# Patient Record
Sex: MALE | Race: WHITE | NOT HISPANIC OR LATINO | URBAN - METROPOLITAN AREA
[De-identification: names, ages, dates, MRNs, and addresses within clinical notes are randomized per-mention and may not be internally consistent; named-entity substitution may affect disease eponyms.]

---

## 2023-02-27 LAB
ANION GAP IN SER/PLAS: 9 MMOL/L (ref 10–20)
CALCIUM (MG/DL) IN SER/PLAS: 8.6 MG/DL (ref 8.6–10.3)
CARBON DIOXIDE, TOTAL (MMOL/L) IN SER/PLAS: 31 MMOL/L (ref 21–32)
CHLORIDE (MMOL/L) IN SER/PLAS: 104 MMOL/L (ref 98–107)
CREATININE (MG/DL) IN SER/PLAS: 0.83 MG/DL (ref 0.5–1.3)
ERYTHROCYTE DISTRIBUTION WIDTH (RATIO) BY AUTOMATED COUNT: 18.9 % (ref 11.5–14.5)
ERYTHROCYTE MEAN CORPUSCULAR HEMOGLOBIN CONCENTRATION (G/DL) BY AUTOMATED: 28.8 G/DL (ref 32–36)
ERYTHROCYTE MEAN CORPUSCULAR VOLUME (FL) BY AUTOMATED COUNT: 86 FL (ref 80–100)
ERYTHROCYTES (10*6/UL) IN BLOOD BY AUTOMATED COUNT: 4.26 X10E12/L (ref 4.5–5.9)
GFR MALE: >90 ML/MIN/1.73M2
GLUCOSE (MG/DL) IN SER/PLAS: 92 MG/DL (ref 74–99)
HEMATOCRIT (%) IN BLOOD BY AUTOMATED COUNT: 36.8 % (ref 41–52)
HEMOGLOBIN (G/DL) IN BLOOD: 10.6 G/DL (ref 13.5–17.5)
LEUKOCYTES (10*3/UL) IN BLOOD BY AUTOMATED COUNT: 9.4 X10E9/L (ref 4.4–11.3)
NRBC (PER 100 WBCS) BY AUTOMATED COUNT: 0 /100 WBC (ref 0–0)
PLATELETS (10*3/UL) IN BLOOD AUTOMATED COUNT: 314 X10E9/L (ref 150–450)
POTASSIUM (MMOL/L) IN SER/PLAS: 4.3 MMOL/L (ref 3.5–5.3)
SODIUM (MMOL/L) IN SER/PLAS: 140 MMOL/L (ref 136–145)
UREA NITROGEN (MG/DL) IN SER/PLAS: 27 MG/DL (ref 6–23)

## 2023-03-02 LAB
ALANINE AMINOTRANSFERASE (SGPT) (U/L) IN SER/PLAS: 8 U/L (ref 10–52)
ALBUMIN (G/DL) IN SER/PLAS: 3.2 G/DL (ref 3.4–5)
ALKALINE PHOSPHATASE (U/L) IN SER/PLAS: 86 U/L (ref 33–136)
ANION GAP IN SER/PLAS: 10 MMOL/L (ref 10–20)
ASPARTATE AMINOTRANSFERASE (SGOT) (U/L) IN SER/PLAS: 9 U/L (ref 9–39)
BILIRUBIN DIRECT (MG/DL) IN SER/PLAS: 0.1 MG/DL (ref 0–0.3)
BILIRUBIN TOTAL (MG/DL) IN SER/PLAS: 0.3 MG/DL (ref 0–1.2)
CALCIUM (MG/DL) IN SER/PLAS: 8.6 MG/DL (ref 8.6–10.3)
CARBON DIOXIDE, TOTAL (MMOL/L) IN SER/PLAS: 31 MMOL/L (ref 21–32)
CHLORIDE (MMOL/L) IN SER/PLAS: 105 MMOL/L (ref 98–107)
CREATININE (MG/DL) IN SER/PLAS: 0.75 MG/DL (ref 0.5–1.3)
ERYTHROCYTE DISTRIBUTION WIDTH (RATIO) BY AUTOMATED COUNT: 18.7 % (ref 11.5–14.5)
ERYTHROCYTE MEAN CORPUSCULAR HEMOGLOBIN CONCENTRATION (G/DL) BY AUTOMATED: 29.1 G/DL (ref 32–36)
ERYTHROCYTE MEAN CORPUSCULAR VOLUME (FL) BY AUTOMATED COUNT: 84 FL (ref 80–100)
ERYTHROCYTES (10*6/UL) IN BLOOD BY AUTOMATED COUNT: 4.28 X10E12/L (ref 4.5–5.9)
GFR MALE: >90 ML/MIN/1.73M2
GLUCOSE (MG/DL) IN SER/PLAS: 85 MG/DL (ref 74–99)
HEMATOCRIT (%) IN BLOOD BY AUTOMATED COUNT: 36.1 % (ref 41–52)
HEMOGLOBIN (G/DL) IN BLOOD: 10.5 G/DL (ref 13.5–17.5)
INR IN PPP BY COAGULATION ASSAY: 1 (ref 0.9–1.1)
LEUKOCYTES (10*3/UL) IN BLOOD BY AUTOMATED COUNT: 9.3 X10E9/L (ref 4.4–11.3)
NRBC (PER 100 WBCS) BY AUTOMATED COUNT: 0 /100 WBC (ref 0–0)
PLATELETS (10*3/UL) IN BLOOD AUTOMATED COUNT: 281 X10E9/L (ref 150–450)
POTASSIUM (MMOL/L) IN SER/PLAS: 3.7 MMOL/L (ref 3.5–5.3)
PROTEIN TOTAL: 6 G/DL (ref 6.4–8.2)
PROTHROMBIN TIME (PT) IN PPP BY COAGULATION ASSAY: 11.7 SEC (ref 9.8–13.4)
SODIUM (MMOL/L) IN SER/PLAS: 142 MMOL/L (ref 136–145)
UREA NITROGEN (MG/DL) IN SER/PLAS: 25 MG/DL (ref 6–23)

## 2023-03-03 LAB
HCV PCR QUANT: NOT DETECTED IU/ML
HCV RNA, PCR LOG: NORMAL LOG10 IU/ML

## 2023-04-26 LAB
ERYTHROCYTE DISTRIBUTION WIDTH (RATIO) BY AUTOMATED COUNT: 16.6 % (ref 11.5–14.5)
ERYTHROCYTE MEAN CORPUSCULAR HEMOGLOBIN CONCENTRATION (G/DL) BY AUTOMATED: 29.5 G/DL (ref 32–36)
ERYTHROCYTE MEAN CORPUSCULAR VOLUME (FL) BY AUTOMATED COUNT: 84 FL (ref 80–100)
ERYTHROCYTES (10*6/UL) IN BLOOD BY AUTOMATED COUNT: 4.12 X10E12/L (ref 4.5–5.9)
HEMATOCRIT (%) IN BLOOD BY AUTOMATED COUNT: 34.6 % (ref 41–52)
HEMOGLOBIN (G/DL) IN BLOOD: 10.2 G/DL (ref 13.5–17.5)
LEUKOCYTES (10*3/UL) IN BLOOD BY AUTOMATED COUNT: 11.8 X10E9/L (ref 4.4–11.3)
NRBC (PER 100 WBCS) BY AUTOMATED COUNT: 0 /100 WBC (ref 0–0)
PLATELETS (10*3/UL) IN BLOOD AUTOMATED COUNT: 319 X10E9/L (ref 150–450)

## 2023-05-12 ENCOUNTER — NURSING HOME VISIT (OUTPATIENT)
Dept: POST ACUTE CARE | Facility: EXTERNAL LOCATION | Age: 63
End: 2023-05-12
Payer: MEDICARE

## 2023-05-12 DIAGNOSIS — G89.29 OTHER CHRONIC PAIN: ICD-10-CM

## 2023-05-12 DIAGNOSIS — R53.1 WEAKNESS: ICD-10-CM

## 2023-05-12 DIAGNOSIS — L97.909 VENOUS ULCER (MULTI): ICD-10-CM

## 2023-05-12 DIAGNOSIS — B19.20 HEPATITIS C VIRUS INFECTION WITHOUT HEPATIC COMA, UNSPECIFIED CHRONICITY: ICD-10-CM

## 2023-05-12 DIAGNOSIS — M54.9 CHRONIC BILATERAL BACK PAIN, UNSPECIFIED BACK LOCATION: Primary | ICD-10-CM

## 2023-05-12 DIAGNOSIS — I87.2 CHRONIC VENOUS INSUFFICIENCY: ICD-10-CM

## 2023-05-12 DIAGNOSIS — I83.009 VENOUS ULCER (MULTI): ICD-10-CM

## 2023-05-12 DIAGNOSIS — R60.0 LEG EDEMA: ICD-10-CM

## 2023-05-12 DIAGNOSIS — G89.29 CHRONIC BILATERAL BACK PAIN, UNSPECIFIED BACK LOCATION: Primary | ICD-10-CM

## 2023-05-12 PROCEDURE — 99305 1ST NF CARE MODERATE MDM 35: CPT | Performed by: INTERNAL MEDICINE

## 2023-05-12 NOTE — LETTER
Patient: Ney Edgar  : 1960    Encounter Date: 2023    HISTORY & PHYSICAL    Subjective  Chief complaint: Ney Edgar is a 62 y.o. male who is a acute skilled care patient being seen and evaluated for multiple medical problems.  Patient presents for weakness.    HPI:  Patient presented to the ED with chronic leg swelling. Patient admitted to the hospital and was given IV lasix. Pt was evaluated by PT and OT and was discharged to a skilled nursing facility.         Past Medical History:   Diagnosis Date   • Personal history of malignant neoplasm of testis     History of malignant neoplasm of testis   • Personal history of other diseases of the circulatory system     History of hypertension       Past Surgical History:   Procedure Laterality Date   • OTHER SURGICAL HISTORY  2019    Cholecystectomy   • OTHER SURGICAL HISTORY  2019    Knee surgery   • OTHER SURGICAL HISTORY  2019    Orchiectomy       No family history on file.    Social History     Socioeconomic History   • Marital status: Single     Spouse name: Not on file   • Number of children: Not on file   • Years of education: Not on file   • Highest education level: Not on file   Occupational History   • Not on file   Tobacco Use   • Smoking status: Not on file   • Smokeless tobacco: Not on file   Substance and Sexual Activity   • Alcohol use: Not on file   • Drug use: Not on file   • Sexual activity: Not on file   Other Topics Concern   • Not on file   Social History Narrative   • Not on file     Social Determinants of Health     Financial Resource Strain: Not on file   Food Insecurity: Not on file   Transportation Needs: Not on file   Physical Activity: Not on file   Stress: Not on file   Social Connections: Not on file   Intimate Partner Violence: Not on file   Housing Stability: Not on file       Vital signs: 162/74, 97.1, 87, 16, 96%    Objective  Physical Exam  Constitutional:       Appearance: He is obese.   HENT:       Head: Normocephalic and atraumatic.      Nose: Nose normal.      Mouth/Throat:      Mouth: Mucous membranes are moist.      Pharynx: Oropharynx is clear.   Eyes:      Extraocular Movements: Extraocular movements intact.      Pupils: Pupils are equal, round, and reactive to light.   Cardiovascular:      Rate and Rhythm: Normal rate and regular rhythm.   Pulmonary:      Effort: No respiratory distress.      Breath sounds: Normal breath sounds. No wheezing, rhonchi or rales.   Abdominal:      General: Bowel sounds are normal. There is no distension.      Palpations: Abdomen is soft.      Tenderness: There is no abdominal tenderness. There is no guarding.   Musculoskeletal:      Right lower leg: No edema.      Left lower leg: No edema.   Skin:     General: Skin is warm and dry.   Neurological:      Mental Status: He is alert and oriented to person, place, and time. Mental status is at baseline.   Psychiatric:         Mood and Affect: Mood normal.         Behavior: Behavior normal.         Assessment/Plan  Problem List Items Addressed This Visit       Leg edema    Venous ulcer (CMS/HCC)    Chronic pain    Weakness    Back pain - Primary    Chronic venous insufficiency    Hepatitis-C     Medications, treatments, and labs reviewed  Continue medications and treatments as listed in [facility]    Scribe Attestation  I, Hang Walter   attest that this documentation has been prepared under the direction and in the presence of Carmelo Crabtree DO.    An interactive audio and/or video telecommunication system which permits real time communications between the patient (at the originating site) and provider (at a distant site) was utilized to provide this telehealth service after obtaining verbal consent.    Provider Attestation - Scribe documentation  All medical record entries made by the Scribe were at my direction and personally dictated by me. I have reviewed the chart and agree that the record accurately reflects my  personal performance of the history, physical exam, discussion and plan.    Carmelo Crabtree DO          Electronically Signed By: Carmelo Crabtree DO   6/19/23  4:40 PM

## 2023-05-19 ENCOUNTER — NURSING HOME VISIT (OUTPATIENT)
Dept: POST ACUTE CARE | Facility: EXTERNAL LOCATION | Age: 63
End: 2023-05-19
Payer: MEDICARE

## 2023-05-19 DIAGNOSIS — R60.0 LEG EDEMA: ICD-10-CM

## 2023-05-19 DIAGNOSIS — R53.1 WEAKNESS: ICD-10-CM

## 2023-05-19 DIAGNOSIS — L97.909 VENOUS ULCER (MULTI): ICD-10-CM

## 2023-05-19 DIAGNOSIS — G89.29 OTHER CHRONIC PAIN: Primary | ICD-10-CM

## 2023-05-19 DIAGNOSIS — I83.009 VENOUS ULCER (MULTI): ICD-10-CM

## 2023-05-19 PROCEDURE — 99309 SBSQ NF CARE MODERATE MDM 30: CPT | Performed by: REGISTERED NURSE

## 2023-05-19 NOTE — LETTER
Patient: Ney Edgar  : 1960    Encounter Date: 2023    PROGRESS NOTE    Subjective  Chief complaint: Ney Edgar is a 62 y.o. male who is a acute skilled care patient being seen and evaluated for weakness.    HPI:  23   Nursing staff reported that patient is refusing therapy and dressing change.  Patient was admitted to SNF for therapy d/t weakness after recent hospitalization.   Patient requires assist with ADLs and transfers.  Therapy working with patient on BLE therapy exercises, walker management and gait training.  Patient able to ambulate 20-44 feet with walker and CGA.  Transfers sit-stand with SBA.      Objective  Vital signs: 117/62, 98.8, 96, 18, 93%  Physical Exam  Constitutional:       General: He is not in acute distress.  Eyes:      Extraocular Movements: Extraocular movements intact.   Pulmonary:      Effort: Pulmonary effort is normal.   Musculoskeletal:      Cervical back: Neck supple.   Neurological:      Mental Status: He is alert.   Psychiatric:         Mood and Affect: Mood normal.         Behavior: Behavior is cooperative.         Assessment/Plan  Problem List Items Addressed This Visit          Nervous    Chronic pain - Primary     Continue oral analgesic as needed follow-up pain management            Musculoskeletal    Leg edema     Ace wrap, elevate legs, low-sodium intake            Other    Venous ulcer (CMS/HCC)     Follow up wound team          Weakness     Continue PT OT          Medications, treatments, and labs reviewed  Continue medications and treatments as listed in [facility]    Scribe Attestation  Acacia MAS Scribe   attest that this documentation has been prepared under the direction and in the presence of CARMEN Castle-YARELI    Provider Attestation - Scribe documentation  All medical record entries made by the Scribe were at my direction and personally dictated by me. I have reviewed the chart and agree that the record accurately reflects my  personal performance of the history, physical exam, discussion and plan.   ABIGAIL Castle        Electronically Signed By: ABIGAIL Castle   6/8/23  1:10 PM

## 2023-05-19 NOTE — PROGRESS NOTES
PROGRESS NOTE    Subjective   Chief complaint: Ney Edgar is a 62 y.o. male who is a acute skilled care patient being seen and evaluated for weakness.    HPI:  5/19/23   Nursing staff reported that patient is refusing therapy and dressing change.  Patient was admitted to SNF for therapy d/t weakness after recent hospitalization.   Patient requires assist with ADLs and transfers.  Therapy working with patient on BLE therapy exercises, walker management and gait training.  Patient able to ambulate 20-44 feet with walker and CGA.  Transfers sit-stand with SBA.    Nurses notified me patient lethargic after taking pain medication sleeping outside of smoking refusing dressing changes due to lethargy, and not participating in therapy due to sleepiness I suspect from the oral analgesic and will decrease dose  Objective   Vital signs: 117/62, 98.8, 96, 18, 93%  Physical Exam  Constitutional:       General: He is not in acute distress.  Eyes:      Extraocular Movements: Extraocular movements intact.   Pulmonary:      Effort: Pulmonary effort is normal.   Musculoskeletal:      Cervical back: Neck supple.   Neurological:      Mental Status: He is alert.   Psychiatric:         Mood and Affect: Mood normal.         Behavior: Behavior is cooperative.         Assessment/Plan   Problem List Items Addressed This Visit          Nervous    Chronic pain - Primary     Continue oral analgesic as needed follow-up pain management            Musculoskeletal    Leg edema     Ace wrap, elevate legs, low-sodium intake            Other    Venous ulcer (CMS/[facility])     Follow up wound team          Weakness     Continue PT OT          Medications, treatments, and labs reviewed  Continue medications and treatments as listed in [facility]    Scribe Attestation  I, Hang Packer   attest that this documentation has been prepared under the direction and in the presence of CARMEN Castle-YARELI    Provider Attestation - Scribe  documentation  All medical record entries made by the Scribe were at my direction and personally dictated by me. I have reviewed the chart and agree that the record accurately reflects my personal performance of the history, physical exam, discussion and plan.   Carmelo Franco, APRN-CNP

## 2023-05-22 ENCOUNTER — NURSING HOME VISIT (OUTPATIENT)
Dept: POST ACUTE CARE | Facility: EXTERNAL LOCATION | Age: 63
End: 2023-05-22
Payer: MEDICARE

## 2023-05-22 DIAGNOSIS — G89.29 OTHER CHRONIC PAIN: Primary | ICD-10-CM

## 2023-05-22 DIAGNOSIS — R60.0 LEG EDEMA: ICD-10-CM

## 2023-05-22 DIAGNOSIS — L97.909 VENOUS ULCER (MULTI): ICD-10-CM

## 2023-05-22 DIAGNOSIS — R53.1 WEAKNESS: ICD-10-CM

## 2023-05-22 DIAGNOSIS — I83.009 VENOUS ULCER (MULTI): ICD-10-CM

## 2023-05-22 PROCEDURE — 99309 SBSQ NF CARE MODERATE MDM 30: CPT | Performed by: REGISTERED NURSE

## 2023-05-22 NOTE — LETTER
Patient: Ney Edgar  : 1960    Encounter Date: 2023    PROGRESS NOTE    Subjective  Chief complaint: Ney Edgar is a 62 y.o. male who is a acute skilled care patient being seen and evaluated for monthly general medical care and follow-up.    HPI:  HPI Pt continues to work towards goals in therapy   Objective  Vital signs:   167/86, 97.8, 61, 18, 95%  Physical Exam  Constitutional:       General: He is not in acute distress.  Eyes:      Extraocular Movements: Extraocular movements intact.   Cardiovascular:      Rate and Rhythm: Normal rate and regular rhythm.   Pulmonary:      Effort: Pulmonary effort is normal.      Breath sounds: Normal breath sounds.   Abdominal:      General: Bowel sounds are normal.      Palpations: Abdomen is soft.   Musculoskeletal:      Cervical back: Neck supple.      Right lower leg: No edema.      Left lower leg: No edema.   Neurological:      Mental Status: He is alert.   Psychiatric:         Mood and Affect: Mood normal.         Behavior: Behavior is cooperative.         Assessment/Plan  Problem List Items Addressed This Visit          Nervous    Chronic pain - Primary     Continue analgesic as needed            Musculoskeletal    Leg edema     Ace wrap, elevate legs, low-sodium intake            Other    Venous ulcer (CMS/HCC)     Follow up wound team          Weakness     Continue PT OT          Medications, treatments, and labs reviewed  Continue medications and treatments as listed in [facility]    Scribe Attestation  Acacia AMS Scribe   attest that this documentation has been prepared under the direction and in the presence of CARMEN Castle-YARELI    Provider Attestation - Scribe documentation  All medical record entries made by the Scribe were at my direction and personally dictated by me. I have reviewed the chart and agree that the record accurately reflects my personal performance of the history, physical exam, discussion and plan.   Carmelo NORRIS  ABIGAIL Franco        Electronically Signed By: ABIGAIL Castle   6/15/23  9:31 AM

## 2023-05-23 ENCOUNTER — NURSING HOME VISIT (OUTPATIENT)
Dept: POST ACUTE CARE | Facility: EXTERNAL LOCATION | Age: 63
End: 2023-05-23
Payer: MEDICARE

## 2023-05-23 DIAGNOSIS — R53.1 WEAKNESS: ICD-10-CM

## 2023-05-23 DIAGNOSIS — L97.909 VENOUS ULCER (MULTI): ICD-10-CM

## 2023-05-23 DIAGNOSIS — R60.0 LEG EDEMA: ICD-10-CM

## 2023-05-23 DIAGNOSIS — I83.009 VENOUS ULCER (MULTI): ICD-10-CM

## 2023-05-23 DIAGNOSIS — G89.29 OTHER CHRONIC PAIN: Primary | ICD-10-CM

## 2023-05-23 PROCEDURE — 99308 SBSQ NF CARE LOW MDM 20: CPT | Performed by: REGISTERED NURSE

## 2023-05-23 NOTE — LETTER
Patient: Ney Edgar  : 1960    Encounter Date: 2023    PROGRESS NOTE    Subjective  Chief complaint: Ney Edgar is a 62 y.o. male who is a acute skilled care patient being seen and evaluated for weakness.    HPI:  23   Nursing staff reported that patient is refusing therapy and dressing change.  Patient was admitted to SNF for therapy d/t weakness after recent hospitalization.   Patient requires assist with ADLs and transfers.  Therapy working with patient on BLE therapy exercises, walker management and gait training.  Patient able to ambulate 20-44 feet with walker and CGA.  Transfers sit-stand with SBA.    23    Patient in therapy d/t generalized weakness.  Patient presents for f/u.  Patient has been refusing therapy.  Per nursing staff, patient is non-compliant with dressing change.      Nurses report patient noncompliant with dressing changes and therapy  Objective  Vital signs:  167/86, 97.8, 61, 18, 95%  Physical Exam  Constitutional:       General: He is not in acute distress.  Eyes:      Extraocular Movements: Extraocular movements intact.   Pulmonary:      Effort: Pulmonary effort is normal.   Musculoskeletal:      Cervical back: Neck supple.   Neurological:      Mental Status: He is alert.   Psychiatric:         Mood and Affect: Mood normal.         Behavior: Behavior is cooperative.         Assessment/Plan  Problem List Items Addressed This Visit          Nervous    Chronic pain - Primary     Continue analgesic as needed            Musculoskeletal    Leg edema     Ace wrap, elevate legs, low-sodium intake            Other    Venous ulcer (CMS/HCC)     Follow up wound team          Weakness     Continue PT OT          Medications, treatments, and labs reviewed  Continue medications and treatments as listed in [facility]    Scribe Attestation  I, Hang Packer   attest that this documentation has been prepared under the direction and in the presence of Carmelo Franco,  CARMEN-CNP    Provider Attestation - Scribe documentation  All medical record entries made by the Scribe were at my direction and personally dictated by me. I have reviewed the chart and agree that the record accurately reflects my personal performance of the history, physical exam, discussion and plan.   ABIGAIL Castle        Electronically Signed By: ABIGAIL Castle   6/8/23  2:07 PM

## 2023-05-23 NOTE — PROGRESS NOTES
PROGRESS NOTE    Subjective   Chief complaint: Ney Edgar is a 62 y.o. male who is a acute skilled care patient being seen and evaluated for monthly general medical care and follow-up.    HPI:  HPI Pt continues to work towards goals in therapy   Objective   Vital signs:   167/86, 97.8, 61, 18, 95%  Physical Exam  Constitutional:       General: He is not in acute distress.  Eyes:      Extraocular Movements: Extraocular movements intact.   Cardiovascular:      Rate and Rhythm: Normal rate and regular rhythm.   Pulmonary:      Effort: Pulmonary effort is normal.      Breath sounds: Normal breath sounds.   Abdominal:      General: Bowel sounds are normal.      Palpations: Abdomen is soft.   Musculoskeletal:      Cervical back: Neck supple.      Right lower leg: No edema.      Left lower leg: No edema.   Neurological:      Mental Status: He is alert.   Psychiatric:         Mood and Affect: Mood normal.         Behavior: Behavior is cooperative.         Assessment/Plan   Problem List Items Addressed This Visit          Nervous    Chronic pain - Primary     Continue analgesic as needed            Musculoskeletal    Leg edema     Ace wrap, elevate legs, low-sodium intake            Other    Venous ulcer (CMS/HCC)     Follow up wound team          Weakness     Continue PT OT          Medications, treatments, and labs reviewed  Continue medications and treatments as listed in [facility]    Scribe Attestation  Acacia MAS Scribe   attest that this documentation has been prepared under the direction and in the presence of ABIGAIL Castle    Provider Attestation - Scribe documentation  All medical record entries made by the Scribe were at my direction and personally dictated by me. I have reviewed the chart and agree that the record accurately reflects my personal performance of the history, physical exam, discussion and plan.   ABIGAIL Castle

## 2023-05-23 NOTE — PROGRESS NOTES
PROGRESS NOTE    Subjective   Chief complaint: Ney Edgar is a 62 y.o. male who is a acute skilled care patient being seen and evaluated for weakness.    HPI:  5/19/23   Nursing staff reported that patient is refusing therapy and dressing change.  Patient was admitted to SNF for therapy d/t weakness after recent hospitalization.   Patient requires assist with ADLs and transfers.  Therapy working with patient on BLE therapy exercises, walker management and gait training.  Patient able to ambulate 20-44 feet with walker and CGA.  Transfers sit-stand with SBA.    5/23/23    Patient in therapy d/t generalized weakness.  Patient presents for f/u.  Patient has been refusing therapy.  Per nursing staff, patient is non-compliant with dressing change.      Nurses report patient noncompliant with dressing changes and therapy  Objective   Vital signs:  167/86, 97.8, 61, 18, 95%  Physical Exam  Constitutional:       General: He is not in acute distress.  Eyes:      Extraocular Movements: Extraocular movements intact.   Pulmonary:      Effort: Pulmonary effort is normal.   Musculoskeletal:      Cervical back: Neck supple.   Neurological:      Mental Status: He is alert.   Psychiatric:         Mood and Affect: Mood normal.         Behavior: Behavior is cooperative.         Assessment/Plan   Problem List Items Addressed This Visit          Nervous    Chronic pain - Primary     Continue analgesic as needed            Musculoskeletal    Leg edema     Ace wrap, elevate legs, low-sodium intake            Other    Venous ulcer (CMS/HCC)     Follow up wound team          Weakness     Continue PT OT          Medications, treatments, and labs reviewed  Continue medications and treatments as listed in [facility]    Scribe Attestation  I, Hang Packer   attest that this documentation has been prepared under the direction and in the presence of ABIGAIL Castle    Provider Attestation - Scribe documentation  All medical  record entries made by the Scribe were at my direction and personally dictated by me. I have reviewed the chart and agree that the record accurately reflects my personal performance of the history, physical exam, discussion and plan.   Carmelo Franco, APRN-CNP

## 2023-05-24 LAB
ANION GAP IN SER/PLAS: 14 MMOL/L (ref 10–20)
CALCIUM (MG/DL) IN SER/PLAS: 8 MG/DL (ref 8.6–10.3)
CARBON DIOXIDE, TOTAL (MMOL/L) IN SER/PLAS: 29 MMOL/L (ref 21–32)
CHLORIDE (MMOL/L) IN SER/PLAS: 103 MMOL/L (ref 98–107)
CREATININE (MG/DL) IN SER/PLAS: 0.76 MG/DL (ref 0.5–1.3)
ERYTHROCYTE DISTRIBUTION WIDTH (RATIO) BY AUTOMATED COUNT: 16.2 % (ref 11.5–14.5)
ERYTHROCYTE MEAN CORPUSCULAR HEMOGLOBIN CONCENTRATION (G/DL) BY AUTOMATED: 30.2 G/DL (ref 32–36)
ERYTHROCYTE MEAN CORPUSCULAR VOLUME (FL) BY AUTOMATED COUNT: 83 FL (ref 80–100)
ERYTHROCYTES (10*6/UL) IN BLOOD BY AUTOMATED COUNT: 3.74 X10E12/L (ref 4.5–5.9)
GFR MALE: >90 ML/MIN/1.73M2
GLUCOSE (MG/DL) IN SER/PLAS: 85 MG/DL (ref 74–99)
HEMATOCRIT (%) IN BLOOD BY AUTOMATED COUNT: 31.1 % (ref 41–52)
HEMOGLOBIN (G/DL) IN BLOOD: 9.4 G/DL (ref 13.5–17.5)
LEUKOCYTES (10*3/UL) IN BLOOD BY AUTOMATED COUNT: 14.1 X10E9/L (ref 4.4–11.3)
NRBC (PER 100 WBCS) BY AUTOMATED COUNT: 0 /100 WBC (ref 0–0)
PLATELETS (10*3/UL) IN BLOOD AUTOMATED COUNT: 239 X10E9/L (ref 150–450)
POTASSIUM (MMOL/L) IN SER/PLAS: 4.1 MMOL/L (ref 3.5–5.3)
SODIUM (MMOL/L) IN SER/PLAS: 142 MMOL/L (ref 136–145)
UREA NITROGEN (MG/DL) IN SER/PLAS: 29 MG/DL (ref 6–23)

## 2023-05-26 ENCOUNTER — NURSING HOME VISIT (OUTPATIENT)
Dept: POST ACUTE CARE | Facility: EXTERNAL LOCATION | Age: 63
End: 2023-05-26
Payer: MEDICARE

## 2023-05-26 DIAGNOSIS — J06.9 UPPER RESPIRATORY TRACT INFECTION, UNSPECIFIED TYPE: Primary | ICD-10-CM

## 2023-05-26 DIAGNOSIS — L97.909 VENOUS ULCER (MULTI): ICD-10-CM

## 2023-05-26 DIAGNOSIS — R53.1 WEAKNESS: ICD-10-CM

## 2023-05-26 DIAGNOSIS — G89.29 OTHER CHRONIC PAIN: ICD-10-CM

## 2023-05-26 DIAGNOSIS — I83.009 VENOUS ULCER (MULTI): ICD-10-CM

## 2023-05-26 DIAGNOSIS — R60.0 LEG EDEMA: ICD-10-CM

## 2023-05-26 LAB
ANION GAP IN SER/PLAS: 10 MMOL/L (ref 10–20)
CALCIUM (MG/DL) IN SER/PLAS: 8.2 MG/DL (ref 8.6–10.3)
CARBON DIOXIDE, TOTAL (MMOL/L) IN SER/PLAS: 32 MMOL/L (ref 21–32)
CHLORIDE (MMOL/L) IN SER/PLAS: 103 MMOL/L (ref 98–107)
CREATININE (MG/DL) IN SER/PLAS: 0.77 MG/DL (ref 0.5–1.3)
ERYTHROCYTE DISTRIBUTION WIDTH (RATIO) BY AUTOMATED COUNT: 16.2 % (ref 11.5–14.5)
ERYTHROCYTE MEAN CORPUSCULAR HEMOGLOBIN CONCENTRATION (G/DL) BY AUTOMATED: 30.2 G/DL (ref 32–36)
ERYTHROCYTE MEAN CORPUSCULAR VOLUME (FL) BY AUTOMATED COUNT: 84 FL (ref 80–100)
ERYTHROCYTES (10*6/UL) IN BLOOD BY AUTOMATED COUNT: 3.84 X10E12/L (ref 4.5–5.9)
GFR MALE: >90 ML/MIN/1.73M2
GLUCOSE (MG/DL) IN SER/PLAS: 97 MG/DL (ref 74–99)
HEMATOCRIT (%) IN BLOOD BY AUTOMATED COUNT: 32.4 % (ref 41–52)
HEMOGLOBIN (G/DL) IN BLOOD: 9.8 G/DL (ref 13.5–17.5)
LEUKOCYTES (10*3/UL) IN BLOOD BY AUTOMATED COUNT: 11.7 X10E9/L (ref 4.4–11.3)
NRBC (PER 100 WBCS) BY AUTOMATED COUNT: 0 /100 WBC (ref 0–0)
PLATELETS (10*3/UL) IN BLOOD AUTOMATED COUNT: 228 X10E9/L (ref 150–450)
POTASSIUM (MMOL/L) IN SER/PLAS: 4.3 MMOL/L (ref 3.5–5.3)
SODIUM (MMOL/L) IN SER/PLAS: 141 MMOL/L (ref 136–145)
UREA NITROGEN (MG/DL) IN SER/PLAS: 27 MG/DL (ref 6–23)

## 2023-05-26 PROCEDURE — 99316 NF DSCHRG MGMT 30 MIN+: CPT | Performed by: REGISTERED NURSE

## 2023-05-26 NOTE — LETTER
Patient: Ney Edgar  : 1960    Encounter Date: 2023    PROGRESS NOTE    Subjective  Chief complaint: Ney Edgar is a 62 y.o. male who is a acute skilled care patient being seen and evaluated for weakness.    HPI:  23   Nursing staff reported that patient is refusing therapy and dressing change.  Patient was admitted to SNF for therapy d/t weakness after recent hospitalization.   Patient requires assist with ADLs and transfers.  Therapy working with patient on BLE therapy exercises, walker management and gait training.  Patient able to ambulate 20-44 feet with walker and CGA.  Transfers sit-stand with SBA.    23    Patient in therapy d/t generalized weakness.  Patient presents for f/u.  Patient has been refusing therapy.  Per nursing staff, patient is non-compliant with dressing change.      23   Patient has been working in therapy.  Planning to discharge home.  No acute concerns or questions.        Objective  Vital signs:  16, 162/74, 97.1, 87, 96%  Physical Exam  Constitutional:       General: He is not in acute distress.  Eyes:      Extraocular Movements: Extraocular movements intact.   Pulmonary:      Effort: Pulmonary effort is normal.   Musculoskeletal:      Cervical back: Neck supple.   Neurological:      Mental Status: He is alert.   Psychiatric:         Mood and Affect: Mood normal.         Behavior: Behavior is cooperative.       Admitting/DC Diagnoses  Assessment/Plan  Problem List Items Addressed This Visit          Nervous    Chronic pain     Continue analgesic as needed follow-up with pain management            Musculoskeletal    Leg edema     Ace wrap, elevate legs, low-sodium intake            Other    Venous ulcer (CMS/HCC)     Follow up wound team          Weakness     Other Visit Diagnoses       Upper respiratory tract infection, unspecified type    -  Primary    Augmentin          Prognosis - Fair  Course - PT/OT  Plan - DC home with home health nursing and  therapy    Medications, treatments, and labs reviewed  Continue medications and treatments as listed in [facility]    Scribe Attestation  IAcacia Scribe   attest that this documentation has been prepared under the direction and in the presence of ABIGAIL Castle    Provider Attestation - Scribe documentation  All medical record entries made by the Scribe were at my direction and personally dictated by me. I have reviewed the chart and agree that the record accurately reflects my personal performance of the history, physical exam, discussion and plan.   ABIGAIL Castle        Electronically Signed By: ABIGAIL Castle   6/19/23 10:19 AM

## 2023-06-06 NOTE — PROGRESS NOTES
PROGRESS NOTE    Subjective   Chief complaint: Ney Edgar is a 62 y.o. male who is a acute skilled care patient being seen and evaluated for weakness.    HPI:  5/19/23   Nursing staff reported that patient is refusing therapy and dressing change.  Patient was admitted to SNF for therapy d/t weakness after recent hospitalization.   Patient requires assist with ADLs and transfers.  Therapy working with patient on BLE therapy exercises, walker management and gait training.  Patient able to ambulate 20-44 feet with walker and CGA.  Transfers sit-stand with SBA.    5/23/23    Patient in therapy d/t generalized weakness.  Patient presents for f/u.  Patient has been refusing therapy.  Per nursing staff, patient is non-compliant with dressing change.      5/26/23   Patient has been working in therapy.  Planning to discharge home.  No acute concerns or questions.        Objective   Vital signs:  16, 162/74, 97.1, 87, 96%  Physical Exam  Constitutional:       General: He is not in acute distress.  Eyes:      Extraocular Movements: Extraocular movements intact.   Pulmonary:      Effort: Pulmonary effort is normal.   Musculoskeletal:      Cervical back: Neck supple.   Neurological:      Mental Status: He is alert.   Psychiatric:         Mood and Affect: Mood normal.         Behavior: Behavior is cooperative.       Admitting/DC Diagnoses  Assessment/Plan   Problem List Items Addressed This Visit          Nervous    Chronic pain     Continue analgesic as needed follow-up with pain management            Musculoskeletal    Leg edema     Ace wrap, elevate legs, low-sodium intake            Other    Venous ulcer (CMS/[facility])     Follow up wound team          Weakness     Other Visit Diagnoses       Upper respiratory tract infection, unspecified type    -  Primary    Augmentin          Prognosis - Fair  Course - PT/OT  Plan - DC home with home health nursing and therapy    Medications, treatments, and labs reviewed  Continue  medications and treatments as listed in [facility]    Scribe Attestation  IAcacia Scribe   attest that this documentation has been prepared under the direction and in the presence of ABIGAIL Castle    Provider Attestation - Scribe documentation  All medical record entries made by the Scribe were at my direction and personally dictated by me. I have reviewed the chart and agree that the record accurately reflects my personal performance of the history, physical exam, discussion and plan.   ABIGAIL Castle

## 2023-06-08 PROBLEM — L97.909 VENOUS ULCER (MULTI): Status: ACTIVE | Noted: 2023-06-08

## 2023-06-08 PROBLEM — R53.1 WEAKNESS: Status: ACTIVE | Noted: 2023-06-08

## 2023-06-08 PROBLEM — R60.0 LEG EDEMA: Status: ACTIVE | Noted: 2023-06-08

## 2023-06-08 PROBLEM — I83.009 VENOUS ULCER (MULTI): Status: ACTIVE | Noted: 2023-06-08

## 2023-06-08 PROBLEM — G89.29 CHRONIC PAIN: Status: ACTIVE | Noted: 2023-06-08

## 2023-06-19 PROBLEM — I87.2 CHRONIC VENOUS INSUFFICIENCY: Status: ACTIVE | Noted: 2023-06-19

## 2023-06-19 PROBLEM — M54.9 BACK PAIN: Status: ACTIVE | Noted: 2023-06-19

## 2023-06-19 PROBLEM — B19.20 HEPATITIS-C: Status: ACTIVE | Noted: 2023-06-19

## 2023-06-19 NOTE — PROGRESS NOTES
HISTORY & PHYSICAL    Subjective   Chief complaint: Ney Edgar is a 62 y.o. male who is a acute skilled care patient being seen and evaluated for multiple medical problems.  Patient presents for weakness.    HPI:  Patient presented to the ED with chronic leg swelling. Patient admitted to the hospital and was given IV lasix. Pt was evaluated by PT and OT and was discharged to a skilled nursing facility.         Past Medical History:   Diagnosis Date    Personal history of malignant neoplasm of testis     History of malignant neoplasm of testis    Personal history of other diseases of the circulatory system     History of hypertension       Past Surgical History:   Procedure Laterality Date    OTHER SURGICAL HISTORY  09/16/2019    Cholecystectomy    OTHER SURGICAL HISTORY  09/16/2019    Knee surgery    OTHER SURGICAL HISTORY  09/16/2019    Orchiectomy       No family history on file.    Social History     Socioeconomic History    Marital status: Single     Spouse name: Not on file    Number of children: Not on file    Years of education: Not on file    Highest education level: Not on file   Occupational History    Not on file   Tobacco Use    Smoking status: Not on file    Smokeless tobacco: Not on file   Substance and Sexual Activity    Alcohol use: Not on file    Drug use: Not on file    Sexual activity: Not on file   Other Topics Concern    Not on file   Social History Narrative    Not on file     Social Determinants of Health     Financial Resource Strain: Not on file   Food Insecurity: Not on file   Transportation Needs: Not on file   Physical Activity: Not on file   Stress: Not on file   Social Connections: Not on file   Intimate Partner Violence: Not on file   Housing Stability: Not on file       Vital signs: 162/74, 97.1, 87, 16, 96%    Objective   Physical Exam  Constitutional:       Appearance: He is obese.   HENT:      Head: Normocephalic and atraumatic.      Nose: Nose normal.      Mouth/Throat:       Mouth: Mucous membranes are moist.      Pharynx: Oropharynx is clear.   Eyes:      Extraocular Movements: Extraocular movements intact.      Pupils: Pupils are equal, round, and reactive to light.   Cardiovascular:      Rate and Rhythm: Normal rate and regular rhythm.   Pulmonary:      Effort: No respiratory distress.      Breath sounds: Normal breath sounds. No wheezing, rhonchi or rales.   Abdominal:      General: Bowel sounds are normal. There is no distension.      Palpations: Abdomen is soft.      Tenderness: There is no abdominal tenderness. There is no guarding.   Musculoskeletal:      Right lower leg: No edema.      Left lower leg: No edema.   Skin:     General: Skin is warm and dry.   Neurological:      Mental Status: He is alert and oriented to person, place, and time. Mental status is at baseline.   Psychiatric:         Mood and Affect: Mood normal.         Behavior: Behavior normal.         Assessment/Plan   Problem List Items Addressed This Visit       Leg edema    Venous ulcer (CMS/HCC)    Chronic pain    Weakness    Back pain - Primary    Chronic venous insufficiency    Hepatitis-C     Medications, treatments, and labs reviewed  Continue medications and treatments as listed in [facility]    Scribe Attestation  I, Hang Walter   attest that this documentation has been prepared under the direction and in the presence of Carmelo Crabtree DO.    An interactive audio and/or video telecommunication system which permits real time communications between the patient (at the originating site) and provider (at a distant site) was utilized to provide this telehealth service after obtaining verbal consent.    Provider Attestation - Scribe documentation  All medical record entries made by the Scribe were at my direction and personally dictated by me. I have reviewed the chart and agree that the record accurately reflects my personal performance of the history, physical exam, discussion and  plan.    Carmelo Crabtree, DO

## 2023-12-19 ENCOUNTER — NURSING HOME VISIT (OUTPATIENT)
Dept: POST ACUTE CARE | Facility: EXTERNAL LOCATION | Age: 63
End: 2023-12-19
Payer: MEDICARE

## 2023-12-19 DIAGNOSIS — I73.9 PERIPHERAL VASCULAR DISEASE (CMS-HCC): ICD-10-CM

## 2023-12-19 DIAGNOSIS — E11.40 TYPE 2 DIABETES MELLITUS WITH DIABETIC NEUROPATHY, UNSPECIFIED WHETHER LONG TERM INSULIN USE (MULTI): ICD-10-CM

## 2023-12-19 DIAGNOSIS — L97.909 VENOUS ULCER (MULTI): Primary | ICD-10-CM

## 2023-12-19 DIAGNOSIS — J44.9 CHRONIC OBSTRUCTIVE PULMONARY DISEASE, UNSPECIFIED COPD TYPE (MULTI): ICD-10-CM

## 2023-12-19 DIAGNOSIS — I83.009 VENOUS ULCER (MULTI): Primary | ICD-10-CM

## 2023-12-19 PROCEDURE — 99306 1ST NF CARE HIGH MDM 50: CPT | Performed by: EMERGENCY MEDICINE
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
## 2023-12-24 PROBLEM — I73.9 PERIPHERAL VASCULAR DISEASE (CMS-HCC): Status: ACTIVE | Noted: 2023-12-24

## 2023-12-24 PROBLEM — E11.40 TYPE 2 DIABETES MELLITUS WITH DIABETIC NEUROPATHY (MULTI): Status: ACTIVE | Noted: 2023-12-24

## 2023-12-24 PROBLEM — J44.9 CHRONIC OBSTRUCTIVE PULMONARY DISEASE (MULTI): Status: ACTIVE | Noted: 2023-12-24
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
## 2024-01-16 ENCOUNTER — NURSING HOME VISIT (OUTPATIENT)
Dept: POST ACUTE CARE | Facility: EXTERNAL LOCATION | Age: 64
End: 2024-01-16
Payer: MEDICARE

## 2024-01-16 DIAGNOSIS — E11.40 TYPE 2 DIABETES MELLITUS WITH DIABETIC NEUROPATHY, UNSPECIFIED WHETHER LONG TERM INSULIN USE (MULTI): ICD-10-CM

## 2024-01-16 DIAGNOSIS — J44.9 CHRONIC OBSTRUCTIVE PULMONARY DISEASE, UNSPECIFIED COPD TYPE (MULTI): ICD-10-CM

## 2024-01-16 DIAGNOSIS — I83.009 VENOUS ULCER (MULTI): Primary | ICD-10-CM

## 2024-01-16 DIAGNOSIS — I73.9 PERIPHERAL VASCULAR DISEASE (CMS-HCC): ICD-10-CM

## 2024-01-16 DIAGNOSIS — L97.909 VENOUS ULCER (MULTI): Primary | ICD-10-CM

## 2024-01-16 PROCEDURE — 99309 SBSQ NF CARE MODERATE MDM 30: CPT | Performed by: EMERGENCY MEDICINE
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
